# Patient Record
Sex: FEMALE | Race: WHITE | URBAN - METROPOLITAN AREA
[De-identification: names, ages, dates, MRNs, and addresses within clinical notes are randomized per-mention and may not be internally consistent; named-entity substitution may affect disease eponyms.]

---

## 2017-11-28 ENCOUNTER — OFFICE VISIT (OUTPATIENT)
Dept: FAMILY MEDICINE CLINIC | Age: 22
End: 2017-11-28

## 2017-11-28 VITALS
WEIGHT: 137.9 LBS | HEIGHT: 64 IN | SYSTOLIC BLOOD PRESSURE: 100 MMHG | OXYGEN SATURATION: 98 % | BODY MASS INDEX: 23.54 KG/M2 | DIASTOLIC BLOOD PRESSURE: 70 MMHG | HEART RATE: 108 BPM

## 2017-11-28 DIAGNOSIS — Z3A.30 30 WEEKS GESTATION OF PREGNANCY: ICD-10-CM

## 2017-11-28 DIAGNOSIS — Z00.00 WELL ADULT EXAM: ICD-10-CM

## 2017-11-28 PROCEDURE — 99395 PREV VISIT EST AGE 18-39: CPT | Performed by: FAMILY MEDICINE

## 2017-11-28 ASSESSMENT — ENCOUNTER SYMPTOMS
VOMITING: 0
PHOTOPHOBIA: 0
STRIDOR: 0
ANAL BLEEDING: 0
SHORTNESS OF BREATH: 0
ABDOMINAL PAIN: 0
RECTAL PAIN: 0
CHEST TIGHTNESS: 0
CHOKING: 0
RHINORRHEA: 0
WHEEZING: 0
ABDOMINAL DISTENTION: 0
BACK PAIN: 0
EYE ITCHING: 0
TROUBLE SWALLOWING: 0
APNEA: 0
EYE REDNESS: 0
COLOR CHANGE: 0
SINUS PRESSURE: 0
FACIAL SWELLING: 0
DIARRHEA: 0
COUGH: 0
CONSTIPATION: 0
NAUSEA: 0
SORE THROAT: 0
EYE DISCHARGE: 0
BLOOD IN STOOL: 0
VOICE CHANGE: 0
EYE PAIN: 0

## 2017-11-28 ASSESSMENT — PATIENT HEALTH QUESTIONNAIRE - PHQ9
1. LITTLE INTEREST OR PLEASURE IN DOING THINGS: 0
2. FEELING DOWN, DEPRESSED OR HOPELESS: 0
SUM OF ALL RESPONSES TO PHQ9 QUESTIONS 1 & 2: 0
SUM OF ALL RESPONSES TO PHQ QUESTIONS 1-9: 0

## 2017-11-28 NOTE — PROGRESS NOTES
Subjective:      Patient ID: Jacquie Gallegos is a 23 y.o. female. HPI--cpx  No cc  has No Known Allergies. Current Outpatient Prescriptions:     Prenatal Vit-Fe Fumarate-FA (PRENATAL PO), Take 1 tablet by mouth, Disp: , Rfl:      has no past medical history on file. No past surgical history on file. reports that she has never smoked. She has never used smokeless tobacco. She reports that she does not drink alcohol or use drugs. family history is not on file. There is no immunization history on file for this patient. Review of Systems   Constitutional: Negative for activity change, appetite change, chills, diaphoresis, fatigue, fever and unexpected weight change. HENT: Negative for congestion, dental problem, drooling, ear discharge, ear pain, facial swelling, hearing loss, mouth sores, nosebleeds, postnasal drip, rhinorrhea, sinus pressure, sneezing, sore throat, tinnitus, trouble swallowing and voice change. Eyes: Negative for photophobia, pain, discharge, redness, itching and visual disturbance. Respiratory: Negative for apnea, cough, choking, chest tightness, shortness of breath, wheezing and stridor. Cardiovascular: Negative for chest pain, palpitations and leg swelling. Gastrointestinal: Negative for abdominal distention, abdominal pain, anal bleeding, blood in stool, constipation, diarrhea, nausea, rectal pain and vomiting. Genitourinary: Negative for difficulty urinating, dysuria, enuresis, flank pain, frequency, genital sores and hematuria. Pregnant   Musculoskeletal: Negative for arthralgias, back pain, gait problem, joint swelling, myalgias, neck pain and neck stiffness. Skin: Negative for color change, pallor, rash and wound. Neurological: Negative for dizziness, tremors, seizures, syncope, facial asymmetry, speech difficulty, weakness, light-headedness, numbness and headaches. Hematological: Negative for adenopathy. Does not bruise/bleed easily.

## 2018-04-12 PROBLEM — Z00.00 WELL ADULT EXAM: Status: RESOLVED | Noted: 2017-11-28 | Resolved: 2018-04-12

## 2023-05-23 ENCOUNTER — HOSPITAL ENCOUNTER (EMERGENCY)
Facility: HOSPITAL | Age: 28
Discharge: HOME | End: 2023-05-23
Attending: EMERGENCY MEDICINE
Payer: MEDICAID

## 2023-05-23 VITALS
RESPIRATION RATE: 16 BRPM | WEIGHT: 131.94 LBS | DIASTOLIC BLOOD PRESSURE: 73 MMHG | BODY MASS INDEX: 22.53 KG/M2 | HEART RATE: 69 BPM | HEIGHT: 64 IN | TEMPERATURE: 99 F | SYSTOLIC BLOOD PRESSURE: 120 MMHG | OXYGEN SATURATION: 100 %

## 2023-05-23 DIAGNOSIS — O20.0 THREATENED MISCARRIAGE: Primary | ICD-10-CM

## 2023-05-23 DIAGNOSIS — N39.0 LOWER URINARY TRACT INFECTION: ICD-10-CM

## 2023-05-23 LAB
ABO + RH BLD: NORMAL
B-HCG UR QL: POSITIVE
BACTERIA #/AREA URNS HPF: ABNORMAL /HPF
BILIRUB UR QL STRIP: NEGATIVE
BLD GP AB SCN CELLS X3 SERPL QL: NORMAL
CLARITY UR: ABNORMAL
COLOR UR: YELLOW
GLUCOSE UR QL STRIP: NEGATIVE
HCG INTACT+B SERPL-ACNC: NORMAL MIU/ML
HGB UR QL STRIP: ABNORMAL
HYALINE CASTS #/AREA URNS LPF: 1 /LPF
KETONES UR QL STRIP: NEGATIVE
LEUKOCYTE ESTERASE UR QL STRIP: NEGATIVE
MICROSCOPIC COMMENT: ABNORMAL
NITRITE UR QL STRIP: POSITIVE
PH UR STRIP: 7 [PH] (ref 5–8)
PROT UR QL STRIP: ABNORMAL
RBC #/AREA URNS HPF: 2 /HPF (ref 0–4)
SP GR UR STRIP: 1.02 (ref 1–1.03)
SPECIMEN OUTDATE: NORMAL
SQUAMOUS #/AREA URNS HPF: 15 /HPF
URN SPEC COLLECT METH UR: ABNORMAL
UROBILINOGEN UR STRIP-ACNC: NEGATIVE EU/DL
WBC #/AREA URNS HPF: 3 /HPF (ref 0–5)
WBC CLUMPS URNS QL MICRO: ABNORMAL

## 2023-05-23 PROCEDURE — 81025 URINE PREGNANCY TEST: CPT | Performed by: REGISTERED NURSE

## 2023-05-23 PROCEDURE — 99284 EMERGENCY DEPT VISIT MOD MDM: CPT | Mod: 25

## 2023-05-23 PROCEDURE — 86900 BLOOD TYPING SEROLOGIC ABO: CPT | Performed by: REGISTERED NURSE

## 2023-05-23 PROCEDURE — 81000 URINALYSIS NONAUTO W/SCOPE: CPT | Performed by: REGISTERED NURSE

## 2023-05-23 PROCEDURE — 84702 CHORIONIC GONADOTROPIN TEST: CPT | Performed by: REGISTERED NURSE

## 2023-05-23 RX ORDER — NITROFURANTOIN 25; 75 MG/1; MG/1
100 CAPSULE ORAL 2 TIMES DAILY
Qty: 10 CAPSULE | Refills: 0 | Status: SHIPPED | OUTPATIENT
Start: 2023-05-23 | End: 2023-05-28

## 2023-05-23 NOTE — ED PROVIDER NOTES
Encounter Date: 2023       History     Chief Complaint   Patient presents with    Vaginal Bleeding     Pt states that she found out she was pregnant about 2 weeks ago. Last period Feb. 10. Reports abd cramping and vaginal bleeding starting 2 days ago        28-year-old female presents emergency department with complaints of vaginal bleeding that began 2 days ago.  Patient reports finding out she was pregnant 2 weeks ago.  Patient is .  Patient denies any pelvic pain, abdominal pain, fever, chills, weakness, dizziness or any other symptoms at this time.    The history is provided by the patient.   Review of patient's allergies indicates:  No Known Allergies  No past medical history on file.  No past surgical history on file.  No family history on file.     Review of Systems   Constitutional:  Negative for fever.   HENT:  Negative for sore throat.    Respiratory:  Negative for shortness of breath.    Cardiovascular:  Negative for chest pain.   Gastrointestinal:  Negative for nausea.   Genitourinary:  Positive for vaginal bleeding. Negative for dysuria.   Musculoskeletal:  Negative for back pain.   Skin:  Negative for rash.   Neurological:  Negative for weakness.   Hematological:  Does not bruise/bleed easily.   All other systems reviewed and are negative.    Physical Exam     Initial Vitals [23 1314]   BP Pulse Resp Temp SpO2   134/83 77 16 98.8 °F (37.1 °C) 100 %      MAP       --         Physical Exam    Constitutional: She appears well-developed and well-nourished. She is not diaphoretic. No distress.   HENT:   Head: Normocephalic and atraumatic.   Eyes: Conjunctivae and EOM are normal. Pupils are equal, round, and reactive to light.   Neck: Neck supple.   Normal range of motion.  Cardiovascular:  Normal rate, regular rhythm and normal heart sounds.           No murmur heard.  Pulmonary/Chest: Breath sounds normal. No respiratory distress. She has no wheezes. She has no rales.   Abdominal: Abdomen is  soft. Bowel sounds are normal. There is no abdominal tenderness. There is no rebound and no guarding.   Genitourinary:    Genitourinary Comments: Pelvic: A female chaperone was present for this examination. Nl external inspection. No lesions or abnormalities were visible on the labia majora or minora. Cervical os is closed. There is no CMT. There is moderate blood in the vaginal vault. No discharge. No adnexal tenderness. No adnexal masses.       Musculoskeletal:         General: No tenderness or edema. Normal range of motion.      Cervical back: Normal range of motion and neck supple.     Neurological: She is alert and oriented to person, place, and time. No cranial nerve deficit. GCS score is 15. GCS eye subscore is 4. GCS verbal subscore is 5. GCS motor subscore is 6.   Skin: Skin is warm and dry. Capillary refill takes less than 2 seconds.   Psychiatric: She has a normal mood and affect. Thought content normal.       ED Course   Procedures  Labs Reviewed   PREGNANCY TEST, URINE RAPID - Abnormal; Notable for the following components:       Result Value    Preg Test, Ur Positive (*)     All other components within normal limits    Narrative:     Specimen Source->Urine   URINALYSIS, REFLEX TO URINE CULTURE - Abnormal; Notable for the following components:    Appearance, UA Hazy (*)     Protein, UA Trace (*)     Occult Blood UA 3+ (*)     Nitrite, UA Positive (*)     All other components within normal limits    Narrative:     Specimen Source->Urine   URINALYSIS MICROSCOPIC - Abnormal; Notable for the following components:    Bacteria Moderate (*)     All other components within normal limits    Narrative:     Specimen Source->Urine   HCG, QUANTITATIVE    Narrative:     Release to patient->Immediate   TYPE & SCREEN          Imaging Results              US OB <14 Wks TransAbd & TransVag, Single Gestation (XPD) (Final result)  Result time 05/23/23 16:09:49   Procedure changed from US OB <14 Wks, TransAbd, Single  Gestation     Final result by ESTIVEN Rankin Sr., MD (05/23/23 16:09:49)                   Impression:      1.  There is no intrauterine pregnancy visualized.  There is a small amount of hypoechoic fluid within the endometrial cavity of the uterus.  If clinically indicated, I recommend consideration of correlation with serial beta HCG evaluation.    2.  The ovaries are normal in appearance.    3. If clinically indicated, I recommend consideration of a follow-up ultrasound examination in 1 week.      Electronically signed by: Aron Rankin MD  Date:    05/23/2023  Time:    16:09               Narrative:    EXAMINATION:  US OB <14 WEEKS, TRANSABDOM & TRANSVAG, SINGLE GESTATION (XPD)    CLINICAL HISTORY:  Threatened abortionthreatened miscarriage;    TECHNIQUE:  Multiple static ultrasound images are submitted for interpretation with color flow and spectral Doppler imaging.    COMPARISON:  None    FINDINGS:  The maternal uterus measures 10.0 cm in craniocaudal dimension by 5.2 cm in AP dimension by 5.5 cm in medial-lateral dimension.  There is no intrauterine pregnancy visualized.  There is a small amount of hypoechoic fluid within the endometrial cavity of the uterus.  The endometrial stripe thickness measures 15 mm.    The ovaries are normal in appearance.  The right ovary measures 2.9 cm x 2.4 cm x 2.4 cm.  It has arterial flow with a peak systolic velocity of 18 centimeters/second.  The left ovary measures 4.2 cm x 1.7 cm x 2.4 cm.  It has arterial flow with a peak systolic velocity of 11 centimeters/second.                                       Medications - No data to display  Medical Decision Making:   Clinical Tests:   Lab Tests: Ordered and Reviewed  The following lab test(s) were unremarkable: UPT, Urinalysis and B-HCG       <> Summary of Lab: Pregnancy test positive today, urinalysis shows possible early infection nitrite positive.  Beta hCG over 11,000  Radiological Study: Ordered and Reviewed  ED  Management:  Ultrasound shows no intrauterine pregnancy, with moderate bleeding and beta level over 11,000, symptoms are concerning for miscarriage.  Patient should return the emergency department in 3 days for repeat beta hCG level.  Patient should return sooner if bleeding or pain worsens.                        Clinical Impression:   Final diagnoses:  [O20.0] Threatened miscarriage (Primary)  [N39.0] Lower urinary tract infection        ED Disposition Condition    Discharge Stable          ED Prescriptions       Medication Sig Dispense Start Date End Date Auth. Provider    nitrofurantoin, macrocrystal-monohydrate, (MACROBID) 100 MG capsule Take 1 capsule (100 mg total) by mouth 2 (two) times daily. for 5 days 10 capsule 5/23/2023 5/28/2023 Luisito Loya Jr., YOSEFP          Follow-up Information       Follow up With Specialties Details Why Contact Info    [facility] - Emergency Dept. Emergency Medicine In 3 days Repeat HCG [id number] [facility] [id number] [phone]             Luisito Loya Jr., ION  05/23/23 3135